# Patient Record
Sex: FEMALE | URBAN - METROPOLITAN AREA
[De-identification: names, ages, dates, MRNs, and addresses within clinical notes are randomized per-mention and may not be internally consistent; named-entity substitution may affect disease eponyms.]

---

## 2018-05-24 ENCOUNTER — NEW PATIENT (OUTPATIENT)
Dept: URBAN - METROPOLITAN AREA CLINIC 60 | Facility: CLINIC | Age: 53
End: 2018-05-24
Payer: COMMERCIAL

## 2018-05-24 DIAGNOSIS — H25.13 AGE-RELATED NUCLEAR CATARACT, BILATERAL: Primary | ICD-10-CM

## 2018-05-24 DIAGNOSIS — Z83.511 FAMILY HISTORY OF GLAUCOMA: ICD-10-CM

## 2018-05-24 DIAGNOSIS — H16.223 KERATOCONJUNCTIVITIS SICCA, BILATERAL: ICD-10-CM

## 2018-05-24 DIAGNOSIS — H52.4 PRESBYOPIA: ICD-10-CM

## 2018-05-24 PROCEDURE — 76514 ECHO EXAM OF EYE THICKNESS: CPT | Performed by: OPTOMETRIST

## 2018-05-24 PROCEDURE — 92004 COMPRE OPH EXAM NEW PT 1/>: CPT | Performed by: OPTOMETRIST

## 2018-05-24 RX ORDER — PROPYLENE GLYCOL 0.06 MG/ML
0.6 % EMULSION OPHTHALMIC
Qty: 0 | Refills: 0 | Status: ACTIVE
Start: 2018-05-24

## 2018-05-24 ASSESSMENT — VISUAL ACUITY
OS: 20/25
OD: 20/25

## 2018-05-24 ASSESSMENT — INTRAOCULAR PRESSURE
OS: 16
OD: 15

## 2021-05-16 ENCOUNTER — HOSPITAL ENCOUNTER (EMERGENCY)
Facility: MEDICAL CENTER | Age: 56
End: 2021-05-17
Attending: EMERGENCY MEDICINE
Payer: MEDICAID

## 2021-05-16 VITALS
HEIGHT: 67 IN | RESPIRATION RATE: 16 BRPM | HEART RATE: 84 BPM | DIASTOLIC BLOOD PRESSURE: 71 MMHG | OXYGEN SATURATION: 100 % | WEIGHT: 150 LBS | SYSTOLIC BLOOD PRESSURE: 120 MMHG | BODY MASS INDEX: 23.54 KG/M2 | TEMPERATURE: 97.8 F

## 2021-05-16 DIAGNOSIS — R56.9 SEIZURE-LIKE ACTIVITY (HCC): ICD-10-CM

## 2021-05-16 LAB
BASOPHILS # BLD AUTO: 0.6 % (ref 0–1.8)
BASOPHILS # BLD: 0.05 K/UL (ref 0–0.12)
EKG IMPRESSION: NORMAL
EOSINOPHIL # BLD AUTO: 0.38 K/UL (ref 0–0.51)
EOSINOPHIL NFR BLD: 4.3 % (ref 0–6.9)
ERYTHROCYTE [DISTWIDTH] IN BLOOD BY AUTOMATED COUNT: 46.2 FL (ref 35.9–50)
GLUCOSE BLD-MCNC: 84 MG/DL (ref 65–99)
HCT VFR BLD AUTO: 39 % (ref 37–47)
HGB BLD-MCNC: 12.2 G/DL (ref 12–16)
IMM GRANULOCYTES # BLD AUTO: 0.03 K/UL (ref 0–0.11)
IMM GRANULOCYTES NFR BLD AUTO: 0.3 % (ref 0–0.9)
LYMPHOCYTES # BLD AUTO: 3.23 K/UL (ref 1–4.8)
LYMPHOCYTES NFR BLD: 36.3 % (ref 22–41)
MCH RBC QN AUTO: 28.1 PG (ref 27–33)
MCHC RBC AUTO-ENTMCNC: 31.3 G/DL (ref 33.6–35)
MCV RBC AUTO: 89.9 FL (ref 81.4–97.8)
MONOCYTES # BLD AUTO: 0.58 K/UL (ref 0–0.85)
MONOCYTES NFR BLD AUTO: 6.5 % (ref 0–13.4)
NEUTROPHILS # BLD AUTO: 4.64 K/UL (ref 2–7.15)
NEUTROPHILS NFR BLD: 52 % (ref 44–72)
NRBC # BLD AUTO: 0 K/UL
NRBC BLD-RTO: 0 /100 WBC
PLATELET # BLD AUTO: 252 K/UL (ref 164–446)
PMV BLD AUTO: 9.8 FL (ref 9–12.9)
RBC # BLD AUTO: 4.34 M/UL (ref 4.2–5.4)
WBC # BLD AUTO: 8.9 K/UL (ref 4.8–10.8)

## 2021-05-16 PROCEDURE — 36415 COLL VENOUS BLD VENIPUNCTURE: CPT

## 2021-05-16 PROCEDURE — 85025 COMPLETE CBC W/AUTO DIFF WBC: CPT

## 2021-05-16 PROCEDURE — 93005 ELECTROCARDIOGRAM TRACING: CPT | Performed by: EMERGENCY MEDICINE

## 2021-05-16 PROCEDURE — 80053 COMPREHEN METABOLIC PANEL: CPT

## 2021-05-16 PROCEDURE — 82962 GLUCOSE BLOOD TEST: CPT

## 2021-05-16 PROCEDURE — 99284 EMERGENCY DEPT VISIT MOD MDM: CPT

## 2021-05-17 LAB
ALBUMIN SERPL BCP-MCNC: 4 G/DL (ref 3.2–4.9)
ALBUMIN/GLOB SERPL: 1.5 G/DL
ALP SERPL-CCNC: 136 U/L (ref 30–99)
ALT SERPL-CCNC: 9 U/L (ref 2–50)
ANION GAP SERPL CALC-SCNC: 10 MMOL/L (ref 7–16)
AST SERPL-CCNC: 14 U/L (ref 12–45)
BILIRUB SERPL-MCNC: <0.2 MG/DL (ref 0.1–1.5)
BUN SERPL-MCNC: 24 MG/DL (ref 8–22)
CALCIUM SERPL-MCNC: 8.6 MG/DL (ref 8.5–10.5)
CHLORIDE SERPL-SCNC: 108 MMOL/L (ref 96–112)
CO2 SERPL-SCNC: 24 MMOL/L (ref 20–33)
CREAT SERPL-MCNC: 0.79 MG/DL (ref 0.5–1.4)
GLOBULIN SER CALC-MCNC: 2.7 G/DL (ref 1.9–3.5)
GLUCOSE SERPL-MCNC: 90 MG/DL (ref 65–99)
POTASSIUM SERPL-SCNC: 3.7 MMOL/L (ref 3.6–5.5)
PROT SERPL-MCNC: 6.7 G/DL (ref 6–8.2)
SODIUM SERPL-SCNC: 142 MMOL/L (ref 135–145)

## 2021-05-17 NOTE — ED TRIAGE NOTES
"Chief Complaint   Patient presents with   • Seizure     [place] having seiure like activity, pt given 5mg versed PTA. pt responsive to verbal and painful stumli upon arrival, protecting airway appropriately. per EMS  stated has hx of seizure disorder, unknow what type or med hx       PT BIB [facility] for above complaint. Pt occasionally opens eyes. Unable to further assess patient due to current condition.     ERP at bedside, pt on monitor, seizures pads placed.    BP (!) 166/109   Pulse 86   Temp 36.6 °C (97.8 °F) (Temporal)   Resp 16   Ht 1.702 m (5' 7\")   Wt 68 kg (150 lb)   SpO2 98%   BMI 23.49 kg/m²     "

## 2021-05-17 NOTE — ED PROVIDER NOTES
"ER Provider Note     Scribed for Krish Draper M.D. by Elvis Vaca. 5/16/2021, 11:29 PM.    Primary Care Provider: No primary care provider noted.  Means of Arrival: EMS   History obtained from: EMS  History limited by: Patient's acute medical condition    CHIEF COMPLAINT  Chief Complaint   Patient presents with    Seizure     [place] having seiure like activity, pt given 5mg versed PTA. pt responsive to verbal and painful stumli upon arrival, protecting airway appropriately. per EMS  stated has hx of seizure disorder, unknow what type or med hx       HPI  Rosalina Heart is a 56 y.o. female who presents to the Emergency Department brought in by EMS for acute, recurring seizures. Per EMS, patient had a witnessed seizure episode while at the [facility]. She was still seizing upon their arrival and was treated with Versed intranasally. They state she improved somewhat, but has continued to be intermittently \"shaky\" during transport.  who was on scene reports that the patient has a neurological disorder, but could not recall which.     HPI limited secondary to patient's acute medical condition    REVIEW OF SYSTEMS  See HPI for further details.     Unable to complete ROS secondary to patient's acute medical condition.    PAST MEDICAL HISTORY       SURGICAL HISTORY  patient denies any surgical history    SOCIAL HISTORY  Social History     Tobacco Use    Smoking status: Unknown If Ever Smoked   Substance Use Topics    Alcohol use: Not on file    Drug use: Not on file      Social History     Substance and Sexual Activity   Drug Use Not on file       FAMILY HISTORY  History reviewed. No pertinent family history.    CURRENT MEDICATIONS  Home Medications       Reviewed by Cady Álvarez R.N. (Registered Nurse) on 05/16/21 at 2338  Med List Status: Not Addressed     Medication Last Dose Status        Patient Eleazar Taking any Medications                           ALLERGIES  No Known " "Allergies    PHYSICAL EXAM  VITAL SIGNS: BP (!) 166/109   Pulse 86   Temp 36.6 °C (97.8 °F) (Temporal)   Resp 16   Ht 1.702 m (5' 7\")   Wt 68 kg (150 lb)   SpO2 98%   BMI 23.49 kg/m²    Constitutional: No verbalizing, will spontaneously open eyes sometimes with blank stare.  HENT: No signs of trauma, Bilateral external ears normal, Nose normal.   Eyes: Pupils are equal and reactive, Conjunctiva normal, Non-icteric.   Neck: Normal range of motion, No tenderness, Supple, No stridor.   Lymphatic: No lymphadenopathy noted.   Cardiovascular: Regular rate and rhythm, no palpable thrill  Thorax & Lungs: No respiratory distress,  No chest tenderness.   Abdomen: Bowel sounds normal, Soft, No tenderness, No masses, No pulsatile masses. No peritoneal signs.  Skin: Warm, Dry, No erythema, No rash.   Back: No bony tenderness, No CVA tenderness.   Extremities: Intact distal pulses, No edema, No tenderness, No cyanosis.  Musculoskeletal: Good range of motion in all major joints. No tenderness to palpation or major deformities noted.   Neurologic: Making rhythmic contractions to her upper and lower extremities but will stop and localize to pain.       DIAGNOSTIC STUDIES / PROCEDURES    EKG Interpretation:  Interpreted by me  12 Lead EKG interpreted by me to show:  Normal sinus rhythm  Rate 86  Axis: Normal  Intervals: Normal  Normal T waves  Normal ST segments  My impression of this EKG: Does not indicate ischemia or arrhythmia at this time. No ST-T wave changes and no ectopy with no Brugada syndrome or any hypertrophic cardiomyopathy and no preexcitation and normal intervals      LABS  Labs Reviewed   CBC WITH DIFFERENTIAL - Abnormal; Notable for the following components:       Result Value    MCHC 31.3 (*)     All other components within normal limits   COMP METABOLIC PANEL - Abnormal; Notable for the following components:    Bun 24 (*)     Alkaline Phosphatase 136 (*)     All other components within normal limits "   ESTIMATED GFR   POCT GLUCOSE DEVICE RESULTS     All labs reviewed by me.    COURSE & MEDICAL DECISION MAKING  Pertinent Labs & Imaging studies reviewed. (See chart for details)    This is a 56 y.o. female that presents postictal from seizure versus alteration in mental status. I will evaluate the patient for electrolyte derangements as well as significant anemia. I will reassess after this..     11:29 PM - Patient seen and examined at bedside. Ordered CBC w/ diff, CMP, and EKG.      12:39 AM - Patient was reevaluated at bedside. Discussed lab results with the patient and informed them they are reassuring. Return precautions were discussed with the patient, and they were cleared for discharge at this time. Patient was understanding and agreeable to discharge.     The patient will return for new or worsening symptoms and is stable at the time of discharge.    Patient's mental status is improved and now she is no longer altered. It is unclear if these are true seizures given the fact that when she is having the rhythmic movement she does respond to painful stimuli. There is no significant electrolyte derangements. Her EKG is unremarkable. She is not anemic. We will discharge her home with strict return precautions and follow-up.    The patient is referred to a primary physician for blood pressure management, diabetic screening, and for all other preventative health concerns.    DISPOSITION:  Patient will be discharged home in stable condition.    FOLLOW UP:  [address] [address] [phone]  Go in 2 days        OUTPATIENT MEDICATIONS:  New Prescriptions    No medications on file         CRITICAL CARE  The very real possibilty of a deterioration of this patient's condition required the highest level of my preparedness for sudden, emergent intervention.  I provided critical care services, which included medication orders, frequent reevaluations of the patient's condition  and response to treatment, ordering and reviewing test results, and discussing the case with various consultants.  The critical care time associated with the care of the patient was 35 minutes. Review chart for interventions. This time is exclusive of any other billable procedures.       FINAL IMPRESSION  1. Seizure-like activity (HCC)          Elvis RATLIFF (Scribe), am scribing for, and in the presence of, Krish Draper M.D..    Electronically signed by: Elvis Vaca (Marcia), 5/16/2021    Krish RATLIFF M.D. personally performed the services described in this documentation, as scribed by Elvis Vaca in my presence, and it is both accurate and complete.     The note accurately reflects work and decisions made by me.  Krish Draper M.D.  5/17/2021  2:39 AM

## 2021-05-17 NOTE — ED NOTES
" at bedside. States patient has \"a nerve disorder\", states has been tested for seizures and was negative. States does not take any home medications.   "

## 2021-07-25 ENCOUNTER — APPOINTMENT (OUTPATIENT)
Dept: RADIOLOGY | Facility: MEDICAL CENTER | Age: 56
End: 2021-07-25
Attending: EMERGENCY MEDICINE
Payer: MEDICAID

## 2021-07-25 ENCOUNTER — HOSPITAL ENCOUNTER (EMERGENCY)
Facility: MEDICAL CENTER | Age: 56
End: 2021-07-25
Attending: EMERGENCY MEDICINE
Payer: MEDICAID

## 2021-07-25 VITALS
WEIGHT: 136.69 LBS | HEIGHT: 65 IN | BODY MASS INDEX: 22.77 KG/M2 | SYSTOLIC BLOOD PRESSURE: 132 MMHG | DIASTOLIC BLOOD PRESSURE: 71 MMHG | OXYGEN SATURATION: 99 % | RESPIRATION RATE: 16 BRPM | HEART RATE: 70 BPM | TEMPERATURE: 96.8 F

## 2021-07-25 DIAGNOSIS — M79.671 RIGHT FOOT PAIN: ICD-10-CM

## 2021-07-25 PROCEDURE — 700102 HCHG RX REV CODE 250 W/ 637 OVERRIDE(OP): Performed by: EMERGENCY MEDICINE

## 2021-07-25 PROCEDURE — 73620 X-RAY EXAM OF FOOT: CPT | Mod: RT

## 2021-07-25 PROCEDURE — 90715 TDAP VACCINE 7 YRS/> IM: CPT | Performed by: EMERGENCY MEDICINE

## 2021-07-25 PROCEDURE — 99283 EMERGENCY DEPT VISIT LOW MDM: CPT

## 2021-07-25 PROCEDURE — 90471 IMMUNIZATION ADMIN: CPT

## 2021-07-25 PROCEDURE — 700111 HCHG RX REV CODE 636 W/ 250 OVERRIDE (IP): Performed by: EMERGENCY MEDICINE

## 2021-07-25 PROCEDURE — A9270 NON-COVERED ITEM OR SERVICE: HCPCS | Performed by: EMERGENCY MEDICINE

## 2021-07-25 RX ORDER — IBUPROFEN 600 MG/1
600 TABLET ORAL ONCE
Status: COMPLETED | OUTPATIENT
Start: 2021-07-25 | End: 2021-07-25

## 2021-07-25 RX ADMIN — CLOSTRIDIUM TETANI TOXOID ANTIGEN (FORMALDEHYDE INACTIVATED), CORYNEBACTERIUM DIPHTHERIAE TOXOID ANTIGEN (FORMALDEHYDE INACTIVATED), BORDETELLA PERTUSSIS TOXOID ANTIGEN (GLUTARALDEHYDE INACTIVATED), BORDETELLA PERTUSSIS FILAMENTOUS HEMAGGLUTININ ANTIGEN (FORMALDEHYDE INACTIVATED), BORDETELLA PERTUSSIS PERTACTIN ANTIGEN, AND BORDETELLA PERTUSSIS FIMBRIAE 2/3 ANTIGEN 0.5 ML: 5; 2; 2.5; 5; 3; 5 INJECTION, SUSPENSION INTRAMUSCULAR at 22:09

## 2021-07-25 RX ADMIN — IBUPROFEN 600 MG: 600 TABLET, FILM COATED ORAL at 21:16

## 2021-07-25 ASSESSMENT — FIBROSIS 4 INDEX: FIB4 SCORE: 1

## 2021-07-26 NOTE — DISCHARGE INSTRUCTIONS
Take Motrin every 8 hours for discomfort and apply antibiotic ointment as discussed.  Return if you are not better in 4 to 5 days.

## 2021-07-26 NOTE — ED NOTES
Pt was [facility] with all follow up care. All questions were answered. Pt was given ABX ointment. Pt will apply after she washed her feet. Pt is ambulatory at [facility].

## 2021-07-26 NOTE — ED TRIAGE NOTES
"Chief Complaint   Patient presents with   • Foot Pain     PT reports pain in right heal and now difficulty walking      Blood Pressure 128/80   Pulse 68   Temperature 36.7 °C (98 °F) (Temporal)   Respiration 18   Height 1.651 m (5' 5\")   Weight 62 kg (136 lb 11 oz)   Oxygen Saturation 98%   Body Mass Index 22.75 kg/m²     "

## 2021-07-26 NOTE — ED PROVIDER NOTES
"ED Provider Note    CHIEF COMPLAINT  Chief Complaint   Patient presents with   • Foot Pain     PT reports pain in right heal and now difficulty walking        HPI  Rosalina Heart is a 56 y.o. female who presents with pain to the lateral aspect of the right heel.  The patient states she was walking to her cousin's house and fell like maybe there was something stuck in her right heel.  She has a small fissure to the lateral aspect of the right heel where she has significant discomfort.  The pain does traverse down into the bottom of the calcaneus.  She did not have any direct trauma.  She does not have any midfoot pain.    REVIEW OF SYSTEMS  No other skin complaints, no recent fevers, no nausea or vomiting    PHYSICAL EXAM  VITAL SIGNS: /80   Pulse 68   Temp 36.7 °C (98 °F) (Temporal)   Resp 18   Ht 1.651 m (5' 5\")   Wt 62 kg (136 lb 11 oz)   SpO2 98%   BMI 22.75 kg/m²   In general the patient does not appear toxic    Extremities the patient is not have any skeletal deformities to the right lower extremity.    Skin the patient does have some dry skin around the calcaneus with a small fissure laterally located.  I not appreciate any evidence of foreign body.  She does have some fascial tenderness underneath the calcaneus.    Neurovascular examination is grossly intact of the right lower extremity    RADIOLOGY/PROCEDURES  X-ray shows no obvious foreign body    COURSE & MEDICAL DECISION MAKING  Pertinent Labs & Imaging studies reviewed. (See chart for details)  This a 56-year-old female who presents the emergency department with pain to the right heel.  She has a small fissure and I suspect this is from a combination of the fasciitis as well as the fissure.  The patient will apply antibiotic ointment and take Motrin as needed.  She will return if she is not asymptomatic in 5 to 7 days and sooner if worse.    FINAL IMPRESSION  1.  Right heel pain         Disposition  The patient will be discharged in stable " condition      Electronically signed by: Gee Bonilla M.D., 7/25/2021 9:11 PM

## 2021-09-14 ENCOUNTER — HOSPITAL ENCOUNTER (EMERGENCY)
Dept: HOSPITAL 8 - ED | Age: 56
Discharge: HOME | End: 2021-09-14
Payer: MEDICAID

## 2021-09-14 VITALS — SYSTOLIC BLOOD PRESSURE: 126 MMHG | DIASTOLIC BLOOD PRESSURE: 82 MMHG

## 2021-09-14 VITALS — WEIGHT: 139.55 LBS | BODY MASS INDEX: 24.73 KG/M2 | HEIGHT: 63 IN

## 2021-09-14 DIAGNOSIS — J30.9: ICD-10-CM

## 2021-09-14 DIAGNOSIS — H65.02: Primary | ICD-10-CM

## 2021-09-14 PROCEDURE — 99283 EMERGENCY DEPT VISIT LOW MDM: CPT

## 2021-09-24 ENCOUNTER — HOSPITAL ENCOUNTER (EMERGENCY)
Dept: HOSPITAL 8 - ED | Age: 56
Discharge: HOME | End: 2021-09-24
Payer: MEDICAID

## 2021-09-24 VITALS — BODY MASS INDEX: 23.52 KG/M2 | HEIGHT: 64 IN | WEIGHT: 137.79 LBS

## 2021-09-24 VITALS — SYSTOLIC BLOOD PRESSURE: 117 MMHG | DIASTOLIC BLOOD PRESSURE: 58 MMHG

## 2021-09-24 DIAGNOSIS — M26.622: Primary | ICD-10-CM

## 2021-09-24 DIAGNOSIS — Z88.0: ICD-10-CM

## 2021-09-24 PROCEDURE — 99282 EMERGENCY DEPT VISIT SF MDM: CPT
